# Patient Record
Sex: FEMALE | Race: WHITE | NOT HISPANIC OR LATINO | Employment: PART TIME | URBAN - METROPOLITAN AREA
[De-identification: names, ages, dates, MRNs, and addresses within clinical notes are randomized per-mention and may not be internally consistent; named-entity substitution may affect disease eponyms.]

---

## 2018-03-01 ENCOUNTER — THERAPY VISIT (OUTPATIENT)
Dept: PHYSICAL THERAPY | Facility: CLINIC | Age: 43
End: 2018-03-01
Payer: COMMERCIAL

## 2018-03-01 DIAGNOSIS — M54.12 CERVICAL RADICULOPATHY: Primary | ICD-10-CM

## 2018-03-01 PROCEDURE — 97110 THERAPEUTIC EXERCISES: CPT | Mod: GP | Performed by: PHYSICAL THERAPIST

## 2018-03-01 PROCEDURE — 97161 PT EVAL LOW COMPLEX 20 MIN: CPT | Mod: GP | Performed by: PHYSICAL THERAPIST

## 2018-03-01 NOTE — PROGRESS NOTES
[facility] Initial Evaluation -- Cervical    Evaluation Date: March 1, 2018  Emma Bills is a 42 year old female with a neck condition.   Referral: Dr. 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  Functional disability score (NDI):  36%  VAS score (0-10): 4/10; at worst 9-10/10.    Patient goals/expectations:  Alleviate neck and arm pain so that patient can play with her children, look over her left shoulder to drive with ease, lift to be able to shop, increase activity, and work without neck and L UE pain.  Sleep through the night    HISTORY:    Present symptoms:  Neck pain with sharp pain to elbow and symptoms down the L UE into the middle and ring finger.    Pain quality (sharp/shooting/stabbing/aching/burning/cramping): ache, sharp, shooting, tingling  .  Paresthesia (yes/no):  yes    Present since (onset date): beginning of February.     Symptoms (improving/unchanging/worsening):  Improving with Prednisone..    Symptoms commenced as a result of: ?slept wrong (woke with the pain)   Condition occurred in the following environment:  home    Symptoms at onset (neck/arm/forearm/headache): shoulder and L UE  Constant symptoms (neck/arm/forearm/headache): neck, L shoulder down L UE  Intermittent symptoms (neck/arm/forearm/headache): none    Symptoms are made worse with the following: Always Bending, always Turning, Always Rising, time of day - Always PM and Sometimes on the move  Symptoms are made better with the following: Always Lying and Always When still, heat and medication    Disturbed sleep (yes/no): yes - wakes 1 time and then not able to get back o sleep  Number of pillows: 1 (medium)   Sleeping postures (prone/sup/side R/L): L side is preferred side but now sleeping on her back due to the pain    Previous episodes (0/1-5/6-10/11+): 5 Year of first episode:      Previous history: started with L UE symptoms  Previous treatments: PT in past was helpful     Specific Questions: (as reported by the patient)  Dizziness/Tinnitus/Nausea/Swallowing (pos/neg): negative   Gait/Upper Limbs (normal/abnormal): normal  Medications (nil/NSAIDS/anlag/steroids/anticoag/other):  Steroids  Medical allergies:  Compazine, Droperidol  General health (excellent/good/fair/poor):  excellent  Pertinent medical history:  None  Imaging (None/Xray/MRI/Other):  MRI last episode had cervical MRI  Recent or major surgery (yes/no): no  Night pain (yes/no): yes  Accidents (yes/no): no  Unexplained weight loss (yes/no): no  Barriers at home: no  Other red flags: no    EXAMINATION    Posture:   Sitting (good/fair/poor): good/fair  Standing (good/fair/poor): good     Protruded head (yes/no): no    Wry Neck (right/left/nil):  no  Relevant (yes/no):  no     Correction of posture(better/worse/no effect): better  Other observations:  Decreased cervical lordosis.  Turns body to look to the L    Neurological:    Motor Deficit:  5-/5 Triceps, weakness of intrinsics and thumb ext on L 3/5    Reflexes:  Symmetrical and brisk  Sensory Deficit:  Decreased sensation C7-T1      Dural signs:  Negative Ulnar    Movement Loss:   Erick Mod Min Nil Pain   Protrusion   x  Inc neck pain   Flexion     45 deg with neck   Retraction x    Neck pain    Extension     24 deg with inc neck pain    Lateral flexion R     40 deg with neck pain   Lateral flexion L     45 deg with inc neck pain   Rotation R     75 deg with inc neck pain   Rotation L     75 deg with inc neck pain     Test Movements:   During: produces, abolishes, increases, decreases, no effect, centralizing, peripheralizing  After: better, worse, no better, no worse, no effect, centralized, peripheralized    Pretest symptoms sittin/10 neck pain with symptoms down L UE to just past elbow    Symptoms During Symptoms After ROM increased ROM decreased No Effect   PRO           Rep PRO          RET Increases    No Worse         Rep RET Increases    Worse      x   RET EXT          Rep RET EXT          Pretest symptoms lyin/10 neck and down L UE with dec strength L thumb ext, intrinsics   Symptoms During Symptoms After ROM increased ROM decreased No Effect   RET Increases      No Worse         Rep RET Decreases  Increased ROM    Better  centralizing    x     RET EXT          Rep RET EXT          If required, pretest symptoms sittin/10 L neck and sx down L UE to just past elbow, dec strength thumb ext and intrinsics L hand   Symptoms During Symptoms After ROM increased ROM decreased No Effect   LF-R          Rep LF-R          LF-L Increases    No Worse         Rep LF-L Increases    Better    X  Initially had improved strength but 2nd set dec strength     ROT-R          Rep ROT-R          ROT-L          Rep ROT-L          FLEX          Rep FLEX              Static Tests:   Protrusion:  NT  Flexion:  NT  Retraction:  NT  Extension (sitting/prone/supine):  NT    Other Tests: none    Provisional Classification:  Derangement - Asymmetrical, unilateral, symptoms below elbow    Principle of Management:  Education:  Had patient sit with lumbar roll.  Continue to use roll and good posture at home.  Reviewed centralization/peripheralization.  Discussed assessing neck pain, ROM and thumb strength as baselines; Make sure there is not a worsening of L UE symptoms with exercises or activity at home.  Discussed trying to do exercises lying down as that is where she had her best response.  Equipment provided:  None (has a lumbar roll)  Mechanical therapy (Y/N):  yes   Extension principle:  Repeated supine neck retraction - start with head supported but if not making progress then work to head off edge of bed at home.     Lateral principle:  no  Flexion principle:  no     Other:  no    ASSESSMENT/PLAN:    Patient is a 42 year old female with cervical complaints.    Patient has the following  significant findings with corresponding treatment plan.                Diagnosis 1:  Cervical radiculopathy    Pain -  manual therapy, self management, education, directional preference exercise, home program and modalities as needed  Decreased ROM/flexibility - manual therapy, therapeutic exercise and home program  Decreased joint mobility - manual therapy, therapeutic exercise and home program  Decreased strength - therapeutic exercise, therapeutic activities and home program  Decreased function - therapeutic activities and home program  Impaired posture - neuro re-education, therapeutic activities and home program    Therapy Evaluation Codes:   1) History comprised of:   Personal factors that impact the plan of care:      None.    Comorbidity factors that impact the plan of care are:      None.     Medications impacting care: None.  2) Examination of Body Systems comprised of:   Body structures and functions that impact the plan of care:      Cervical spine.   Activity limitations that impact the plan of care are:      Bathing, Bending, Driving, Dressing, Lifting, Running, Sports and reaching/carrying with the L UE.  3) Clinical presentation characteristics are:   Stable/Uncomplicated.  4) Decision-Making    Moderate complexity using standardized patient assessment instrument and/or measureable assessment of functional outcome.  Cumulative Therapy Evaluation is: Low complexity.    Previous and current functional limitations:  (See Goal Flow Sheet for this information)    Short term and Long term goals: (See Goal Flow Sheet for this information)     Communication ability:  Patient appears to be able to clearly communicate and understand verbal and written communication and follow directions correctly.  Treatment Explanation - The following has been discussed with the patient:   RX ordered/plan of care  Anticipated outcomes  Possible risks and side effects  This patient would benefit from PT intervention to resume  normal activities.   Rehab potential is good.    Frequency:  1-2 X week, once daily  Duration:  for 2 weeks tapering to 1 X a week over 4 weeks  Discharge Plan:  Achieve all LTG.  Independent in home treatment program.  Reach maximal therapeutic benefit.    Please refer to the daily flowsheet for treatment today, total treatment time and time spent performing 1:1 timed codes.

## 2018-03-01 NOTE — LETTER
[facility] PHYSICAL THERAPY  [address]  [facility] [address]  [phone]    2018    Re: Emma Bills   :   1975  MRN:  [id number]   REFERRING PHYSICIAN:   Jesusita Batista    [facility] PHYSICAL THERAPY  Date of Initial Evaluation: 3/1/18  Visits:  Rxs Used: 1  Reason for Referral:  Cervical radiculopathy    EVALUATION SUMMARY    [facility] Initial Evaluation -- Cervical  Evaluation Date: 2018  Emma Bills is a 42 year old female with a neck condition.   Referral: Dr. 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  Functional disability score (NDI):  36%  VAS score (0-10): 4/10; at worst 9-10/10.    Patient goals/expectations:  Alleviate neck and arm pain so that patient can play with her children, look over her left shoulder to drive with ease, lift to be able to shop, increase activity, and work without neck and L UE pain.  Sleep through the night    HISTORY:  Present symptoms:  Neck pain with sharp pain to elbow and symptoms down the L UE into the middle and ring finger.    Pain quality (sharp/shooting/stabbing/aching/burning/cramping): ache, sharp, shooting, tingling  .  Paresthesia (yes/no):  yes    Present since (onset date): beginning of February.     Symptoms (improving/unchanging/worsening):  Improving with Prednisone..    Symptoms commenced as a result of: ?slept wrong (woke with the pain)   Condition occurred in the following environment:  home    Symptoms at onset (neck/arm/forearm/headache): shoulder and L UE  Constant symptoms (neck/arm/forearm/headache): neck, L shoulder down L UE  Intermittent symptoms (neck/arm/forearm/headache): none    Symptoms are made worse with the following: Always Bending, always Turning, Always Rising, time of day - Always PM and Sometimes on the  move  Symptoms are made better with the following: Always Lying and Always When still, heat and medication    Disturbed sleep (yes/no): yes - wakes 1 time and then not able to get back o sleep Number of pillows: 1 (medium)   Sleeping postures (prone/sup/side R/L): L side is preferred side but now sleeping on her back due to the pain    Previous episodes (0/1-5/6-10/11+): 5 Year of first episode: 2015    Previous history: started with L UE symptoms  Previous treatments: PT in past was helpful     Specific Questions: (as reported by the patient)  Dizziness/Tinnitus/Nausea/Swallowing (pos/neg): negative   Gait/Upper Limbs (normal/abnormal): normal  Medications (nil/NSAIDS/anlag/steroids/anticoag/other):  Steroids  Medical allergies:  Compazine, Droperidol  General health (excellent/good/fair/poor):  excellent  Pertinent medical history:  None  Imaging (None/Xray/MRI/Other):  MRI last episode had cervical MRI  Recent or major surgery (yes/no): no  Night pain (yes/no): yes  Accidents (yes/no): no  Unexplained weight loss (yes/no): no  Barriers at home: no  Other red flags: no    EXAMINATION    Posture:   Sitting (good/fair/poor): good/fair  Standing (good/fair/poor): good     Protruded head (yes/no): no    Wry Neck (right/left/nil):  no  Relevant (yes/no):  no     Correction of posture(better/worse/no effect): better  Other observations:  Decreased cervical lordosis.  Turns body to look to the L    Neurological:  Motor Deficit:  5-/5 Triceps, weakness of intrinsics and thumb ext on L 3/5    Reflexes:  Symmetrical and brisk  Sensory Deficit:  Decreased sensation C7-T1      Dural signs:  Negative Ulnar    Movement Loss:   Erick Mod Min Nil Pain   Protrusion   x  Inc neck pain   Flexion     45 deg with neck   Retraction x    Neck pain    Extension     24 deg with inc neck pain    Lateral flexion R     40 deg with neck pain   Lateral flexion L     45 deg with inc neck pain   Rotation R     75 deg with inc neck pain   Rotation  L     75 deg with inc neck pain     Test Movements:   During: produces, abolishes, increases, decreases, no effect, centralizing, peripheralizing  After: better, worse, no better, no worse, no effect, centralized, peripheralized    Pretest symptoms sittin/10 neck pain with symptoms down L UE to just past elbow    Symptoms During Symptoms After ROM increased ROM decreased No Effect   PRO          Rep PRO          RET Increases    No Worse         Rep RET Increases    Worse      x   RET EXT          Rep RET EXT          Pretest symptoms lyin/10 neck and down L UE with dec strength L thumb ext, intrinsics   Symptoms During Symptoms After ROM increased ROM decreased No Effect   RET Increases      No Worse         Rep RET Decreases  Increased ROM    Better  centralizing    x     RET EXT          Rep RET EXT          If required, pretest symptoms sittin/10 L neck and sx down L UE to just past elbow, dec strength thumb ext and intrinsics L hand   Symptoms During Symptoms After ROM increased ROM decreased No Effect   LF-R          Rep LF-R          LF-L Increases    No Worse         Rep LF-L Increases    Better    X  Initially had improved strength but 2nd set dec strength     ROT-R          Rep ROT-R          ROT-L          Rep ROT-L          FLEX          Rep FLEX          Static Tests:   Protrusion:  NT  Flexion:  NT  Retraction:  NT  Extension (sitting/prone/supine):  NT    Other Tests: none    Provisional Classification:  Derangement - Asymmetrical, unilateral, symptoms below elbow    Principle of Management:  Education:  Had patient sit with lumbar roll.  Continue to use roll and good posture at home.  Reviewed centralization/peripheralization.  Discussed assessing neck pain, ROM and thumb strength as baselines; Make sure there is not a worsening of L UE symptoms with exercises or activity at home.  Discussed trying to do exercises lying down as that is where she had her best response.  Equipment  provided:  None (has a lumbar roll)  Mechanical therapy (Y/N):  yes   Extension principle:  Repeated supine neck retraction - start with head supported but if not making progress then work to head off edge of bed at home.     Lateral principle:  no  Flexion principle:  no     Other:  no    ASSESSMENT/PLAN:  Patient is a 42 year old female with cervical complaints.    Patient has the following significant findings with corresponding treatment plan.                  Diagnosis 1:  Cervical radiculopathy    Pain -  manual therapy, self management, education, directional preference exercise, home program and modalities as needed  Decreased ROM/flexibility - manual therapy, therapeutic exercise and home program  Decreased joint mobility - manual therapy, therapeutic exercise and home program  Decreased strength - therapeutic exercise, therapeutic activities and home program  Decreased function - therapeutic activities and home program  Impaired posture - neuro re-education, therapeutic activities and home program    Therapy Evaluation Codes:   1) History comprised of:   Personal factors that impact the plan of care:      None.    Comorbidity factors that impact the plan of care are:      None.     Medications impacting care: None.  2) Examination of Body Systems comprised of:   Body structures and functions that impact the plan of care:      Cervical spine.   Activity limitations that impact the plan of care are:      Bathing, Bending, Driving, Dressing, Lifting, Running, Sports and reaching/carrying with the L UE.  3) Clinical presentation characteristics are:   Stable/Uncomplicated.  4) Decision-Making    Moderate complexity using standardized patient assessment instrument and/or measureable assessment of functional outcome.  Cumulative Therapy Evaluation is: Low complexity.    Previous and current functional limitations:  (See Goal Flow Sheet for this information)    Short term and Long term goals: (See Goal Flow Sheet  for this information)     Communication ability:  Patient appears to be able to clearly communicate and understand verbal and written communication and follow directions correctly.    Treatment Explanation - The following has been discussed with the patient:   RX ordered/plan of care  Anticipated outcomes  Possible risks and side effects    This patient would benefit from PT intervention to resume normal activities.   Rehab potential is good.  Frequency:  1-2 X week, once daily  Duration:  for 2 weeks tapering to 1 X a week over 4 weeks  Discharge Plan:  Achieve all LTG.  Independent in home treatment program.  Reach maximal therapeutic benefit.    Thank you for your referral.    INQUIRIES  Therapist: Pepper Urias, PT, Dip. MDT, FAAOMPT   [facility] PHYSICAL THERAPY  [address]  [facility] [address]  Phone: [phone]  Fax: [phone]

## 2018-03-08 ENCOUNTER — THERAPY VISIT (OUTPATIENT)
Dept: PHYSICAL THERAPY | Facility: CLINIC | Age: 43
End: 2018-03-08
Payer: COMMERCIAL

## 2018-03-08 DIAGNOSIS — M54.12 CERVICAL RADICULOPATHY: ICD-10-CM

## 2018-03-08 PROCEDURE — 97140 MANUAL THERAPY 1/> REGIONS: CPT | Mod: GP | Performed by: PHYSICAL THERAPIST

## 2018-03-09 ENCOUNTER — THERAPY VISIT (OUTPATIENT)
Dept: PHYSICAL THERAPY | Facility: CLINIC | Age: 43
End: 2018-03-09
Payer: COMMERCIAL

## 2018-03-09 DIAGNOSIS — M54.12 CERVICAL RADICULOPATHY: ICD-10-CM

## 2018-03-09 PROCEDURE — 97140 MANUAL THERAPY 1/> REGIONS: CPT | Mod: GP | Performed by: PHYSICAL THERAPIST

## 2018-03-13 ENCOUNTER — THERAPY VISIT (OUTPATIENT)
Dept: PHYSICAL THERAPY | Facility: CLINIC | Age: 43
End: 2018-03-13
Payer: COMMERCIAL

## 2018-03-13 DIAGNOSIS — M54.12 CERVICAL RADICULOPATHY: ICD-10-CM

## 2018-03-13 PROCEDURE — 97140 MANUAL THERAPY 1/> REGIONS: CPT | Mod: GP | Performed by: PHYSICAL THERAPIST

## 2018-03-15 ENCOUNTER — THERAPY VISIT (OUTPATIENT)
Dept: PHYSICAL THERAPY | Facility: CLINIC | Age: 43
End: 2018-03-15
Payer: COMMERCIAL

## 2018-03-15 DIAGNOSIS — M54.12 CERVICAL RADICULOPATHY: ICD-10-CM

## 2018-03-15 PROCEDURE — 97140 MANUAL THERAPY 1/> REGIONS: CPT | Mod: GP | Performed by: PHYSICAL THERAPIST

## 2018-03-22 ENCOUNTER — THERAPY VISIT (OUTPATIENT)
Dept: PHYSICAL THERAPY | Facility: CLINIC | Age: 43
End: 2018-03-22
Payer: COMMERCIAL

## 2018-03-22 DIAGNOSIS — M54.12 CERVICAL RADICULOPATHY: ICD-10-CM

## 2018-03-22 PROCEDURE — 97110 THERAPEUTIC EXERCISES: CPT | Mod: GP | Performed by: PHYSICAL THERAPIST

## 2018-03-22 PROCEDURE — 97140 MANUAL THERAPY 1/> REGIONS: CPT | Mod: GP | Performed by: PHYSICAL THERAPIST

## 2018-04-03 ENCOUNTER — THERAPY VISIT (OUTPATIENT)
Dept: PHYSICAL THERAPY | Facility: CLINIC | Age: 43
End: 2018-04-03
Payer: COMMERCIAL

## 2018-04-03 DIAGNOSIS — M54.12 CERVICAL RADICULOPATHY: ICD-10-CM

## 2018-04-03 PROCEDURE — 97110 THERAPEUTIC EXERCISES: CPT | Mod: GP | Performed by: PHYSICAL THERAPIST

## 2018-04-03 PROCEDURE — 97140 MANUAL THERAPY 1/> REGIONS: CPT | Mod: GP | Performed by: PHYSICAL THERAPIST

## 2018-04-09 ENCOUNTER — THERAPY VISIT (OUTPATIENT)
Dept: PHYSICAL THERAPY | Facility: CLINIC | Age: 43
End: 2018-04-09
Payer: COMMERCIAL

## 2018-04-09 DIAGNOSIS — M54.12 CERVICAL RADICULOPATHY: ICD-10-CM

## 2018-04-09 PROCEDURE — 97140 MANUAL THERAPY 1/> REGIONS: CPT | Mod: GP | Performed by: PHYSICAL THERAPIST

## 2018-04-09 PROCEDURE — 97110 THERAPEUTIC EXERCISES: CPT | Mod: GP | Performed by: PHYSICAL THERAPIST

## 2018-04-24 ENCOUNTER — THERAPY VISIT (OUTPATIENT)
Dept: PHYSICAL THERAPY | Facility: CLINIC | Age: 43
End: 2018-04-24
Payer: COMMERCIAL

## 2018-04-24 DIAGNOSIS — M54.12 CERVICAL RADICULOPATHY: ICD-10-CM

## 2018-04-24 PROCEDURE — 97530 THERAPEUTIC ACTIVITIES: CPT | Mod: GP | Performed by: PHYSICAL THERAPIST

## 2018-04-24 PROCEDURE — 97110 THERAPEUTIC EXERCISES: CPT | Mod: GP | Performed by: PHYSICAL THERAPIST

## 2018-04-24 NOTE — PROGRESS NOTES
"Subjective:  HPI                    Objective:  System    Physical Exam    General     ROS    Assessment/Plan:    PROGRESS  REPORT    Progress reporting period is from 3/1/2018 to 4/24/2018.       SUBJECTIVE  Patient relates that she feels she is \"stuck\". She has had no change since last treatment, however, now is feeling more symptoms in the neck and along the spine. Arm pain is constantly there other than when laying down on back/sleeping. She feels her ROM and strength are better, but her symptoms are not. 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  Not at work now..Continues to do isometrics and SB range of motion at home. Now tolerant of doing her exercises sitting vs laying down. Not taking prednisone or any other anti inflammatory at this time     Current Pain level: 3/10 (between shoulder blades; 1/10 down arm middle finger).     Initial Pain level:  (9-10/10).   Changes in function:  Yes (See Goal flowsheet attached for changes in current functional level)  Adverse reaction to treatment or activity: None    OBJECTIVE  Changes noted in objective findings:  Yes, improved strength.  ROM tends to fluctuate,   Objective:   MMT: C6 -5/5;  5/5 thumb extension (was weak with thumb ext 3/5, at evaluation)  CROM L rot 58 deg, R rot 68, ext 14 limited by pain, R SB 32, L SB 35 stiffness and pain B  ROM improves with repeated SB to the L (ext 39 deg with B rotation 75 degrees B).  Cues needed for proper posture.       ASSESSMENT/PLAN  Updated problem list and treatment plan: Diagnosis 1:  Cervical radiculopathy    Pain -  manual therapy, self management, education, directional preference exercise and home program  Decreased ROM/flexibility - manual therapy, therapeutic exercise and home program  Decreased joint mobility - manual therapy, therapeutic exercise and home program  Decreased strength - therapeutic exercise, therapeutic activities and home program  Impaired muscle " performance - neuro re-education and home program  Decreased function - therapeutic activities and home program  Impaired posture - neuro re-education, therapeutic activities and home program  STG/LTGs have been met or progress has been made towards goals:  Yes (See Goal flow sheet completed today.)  Assessment of Progress: The patient's condition is improving.  The patient's condition has potential to improve.  Patient is meeting short term goals and is progressing towards long term goals.  Self Management Plans:  Patient has been instructed in a home treatment program.  Patient  has been instructed in self management of symptoms.  I have re-evaluated this patient and find that the nature, scope, duration and intensity of the therapy is appropriate for the medical condition of the patient.  Emma continues to require the following intervention to meet STG and LTG's:  PT    Recommendations:  This patient would benefit from continued therapy.     Frequency:  1 X week, once daily  Duration:  for 8 weeks        Please refer to the daily flowsheet for treatment today, total treatment time and time spent performing 1:1 timed codes.

## 2018-04-24 NOTE — LETTER
"[facility] PHYSICAL THERAPY  [address]  [facility] [address]  [phone]    2018    Re: Emma Bills   :   1975  MRN:  [id number]   REFERRING PHYSICIAN:   Jesusita Batista    [facility] PHYSICAL THERAPY  Date of Initial Evaluation:  3/8/18  Visits:  Rxs Used: 9  Reason for Referral:  Cervical radiculopathy    PROGRESS  REPORT  Progress reporting period is from 3/1/2018 to 2018.       SUBJECTIVE  Patient relates that she feels she is \"stuck\". She has had no change since last treatment, however, now is feeling more symptoms in the neck and along the spine. Arm pain is constantly there other than when laying down on back/sleeping. She feels her ROM and strength are better, but her symptoms are not. 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  Not at work now..Continues to do isometrics and SB range of motion at home. Now tolerant of doing her exercises sitting vs laying down. Not taking prednisone or any other anti inflammatory at this time.       Current Pain level: 3/10 (between shoulder blades; 1/10 down arm middle finger).     Initial Pain level:  (9-10/10).   Changes in function:  Yes (See Goal flowsheet attached for changes in current functional level)  Adverse reaction to treatment or activity: None    OBJECTIVE  Changes noted in objective findings:  Yes, improved strength.  ROM tends to fluctuate,     Objective:   MMT: C6 -5/5;  5/5 thumb extension (was weak with thumb ext 3/5, at evaluation)  CROM L rot 58 deg, R rot 68, ext 14 limited by pain, R SB 32, L SB 35 stiffness and pain B    ROM improves with repeated SB to the L (ext 39 deg with B rotation 75 degrees B).  Cues needed for proper posture.       ASSESSMENT/PLAN  Updated problem list and treatment plan:     Diagnosis 1:  Cervical radiculopathy    Pain -  manual therapy, self management, education, directional preference exercise and home " program  Decreased ROM/flexibility - manual therapy, therapeutic exercise and home program  Decreased joint mobility - manual therapy, therapeutic exercise and home program  Decreased strength - therapeutic exercise, therapeutic activities and home program  Impaired muscle performance - neuro re-education and home program  Decreased function - therapeutic activities and home program  Impaired posture - neuro re-education, therapeutic activities and home program    STG/LTGs have been met or progress has been made towards goals:  Yes (See Goal flow sheet completed today.)    Assessment of Progress: The patient's condition is improving.  The patient's condition has potential to improve.    Patient is meeting short term goals and is progressing towards long term goals.  Self Management Plans:  Patient has been instructed in a home treatment program.  Patient  has been instructed in self management of symptoms.    I have re-evaluated this patient and find that the nature, scope, duration and intensity of the therapy is appropriate for the medical condition of the patient.    Emma continues to require the following intervention to meet STG and LTG's:  PT    Recommendations:  This patient would benefit from continued therapy.     Frequency:  1 X week, once daily  Duration:  for 8 weeks    Thank you for your referral.    INQUIRIES  Therapist: Pepper Urias, PT, Dip. MDT, FAAOMPT  [facility] PHYSICAL THERAPY  [address]  [facility] [address]  Phone: [phone]  Fax: [phone]

## 2018-05-02 ENCOUNTER — THERAPY VISIT (OUTPATIENT)
Dept: PHYSICAL THERAPY | Facility: CLINIC | Age: 43
End: 2018-05-02
Payer: COMMERCIAL

## 2018-05-02 DIAGNOSIS — M54.12 CERVICAL RADICULOPATHY: ICD-10-CM

## 2018-05-02 PROCEDURE — 97110 THERAPEUTIC EXERCISES: CPT | Mod: GP | Performed by: PHYSICAL THERAPIST

## 2018-05-09 ENCOUNTER — THERAPY VISIT (OUTPATIENT)
Dept: PHYSICAL THERAPY | Facility: CLINIC | Age: 43
End: 2018-05-09
Payer: COMMERCIAL

## 2018-05-09 DIAGNOSIS — M54.12 CERVICAL RADICULOPATHY: ICD-10-CM

## 2018-05-09 PROCEDURE — 97110 THERAPEUTIC EXERCISES: CPT | Mod: GP | Performed by: PHYSICAL THERAPIST

## 2018-05-09 NOTE — LETTER
[facility] PHYSICAL THERAPY  [address]  [facility] [address]  [phone]    May 9, 2018    Re: Emma Bills   :   1975  MRN:  [id number]   REFERRING PHYSICIAN:   Jesusita Batista    [facility] PHYSICAL THERAPY  Date of Initial Evaluation: 3/1/18  Visits:  Rxs Used: 11  Reason for Referral:  Cervical radiculopathy    PROGRESS  REPORT  Progress reporting period is from 2018 to .       SUBJECTIVE  Patient relates that she is continuing to experience constant pain of the lower neck, between her shoulder blades and down the L UE into her knuckles.  She has had much less numbness or tingling of the L UE.  She will feel numbness of the L UE for about a minute after her neck exercises (neck extension).  After the numbness resolves, her pain for the L UE may be a little more.  Does feel that over the past week she has had less sensitivity for the L UE.  She has less pain when putting lotion on the L UE, rings on the L hand are not bothering like they did before but cold water on the L hand is still very painful.  Patient is able to be more active during the day, but by about 8 pm she is done for the day with increased pain and just needs to lay down.      Patient has not been working due to low census and due to her symptoms.  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  She is fearful to have surgery, but feels that she can not live the way that she is feeling.  Fearful that she will have another flare up if this episode finally clears.  This episode was much more painful and is lasting significantly longer than the first episode of neck and L UE symptoms that she had a couple of years ago.  Really uncertain which route to take - surgery or no surgery.         Current Pain level: 3/10 (at worst 5-6/10 when gets the random shooting pain down L UE).      Previous pain level was  3/10   Initial Pain level: 9-10/10.   Changes in function:  Yes (See Goal flowsheet attached for changes in current functional level)  Adverse reaction to treatment or activity: None    OBJECTIVE  Changes noted in objective findings:  Yes, improved neck ROM, increased strength, improving posture and function.    Objective:   Patient tends to sit with slight flexion of the neck with a slight bend to the L when symptoms are more intense; able to self correct when symptoms are not as bad.    MMT for L UE Thumb ext 5-/5, intrinsics 5-/5, pain with resisted elbow extension - not sure if gives due to pain or weakness or both.    CROM Rot R 73 with symptoms down the L UE, Rot L 65 with symptoms R neck, ext 45.      ROM will improve with repeated neck retraction without worsening of arm symptoms.   Normalized sensation to light touch today     ASSESSMENT/PLAN  Updated problem list and treatment plan:     Diagnosis 1:  Cervical radiculopathy    Pain -  manual therapy, self management, education, directional preference exercise and home program  Decreased ROM/flexibility - manual therapy, therapeutic exercise and home program  Decreased joint mobility - manual therapy, therapeutic exercise and home program  Decreased strength - therapeutic exercise, therapeutic activities and home program  Decreased function - therapeutic activities and home program  Impaired posture - neuro re-education, therapeutic activities and home program    STG/LTGs have been met or progress has been made towards goals:  Yes (See Goal flow sheet completed today - ROM is improving for extension.) and progress has been very slow.  Increasing activity at home..      Assessment of Progress: The patient's condition is improving.    The patient's condition has potential to improve.  Patient is meeting short term goals and is progressing towards long term goals.  Slow progress    Self Management Plans:  Patient has been instructed  in a home treatment program.  Patient  has been instructed in self management of symptoms.    I have re-evaluated this patient and find that the nature, scope, duration and intensity of the therapy is appropriate for the medical condition of the patient.    Emma continues to require the following intervention to meet STG and LTG's:  PT    Recommendations:  This patient would benefit from continued therapy.     Frequency:  1 X week, once daily  Duration:  for 6 weeks  Continue as indicated.  Did regress forces with neck exercises to avoid producing numbness of the L UE.       Thank you for your referral.    INQUIRIES  Therapist: Pepper Urias, PT, Dip. MDT, FAAOMPT   [facility] PHYSICAL THERAPY  [address]  [facility] [address]  Phone: [phone] Fax: [phone]

## 2018-05-09 NOTE — PROGRESS NOTES
Subjective:  HPI                    Objective:  System    Physical Exam    General     ROS    Assessment/Plan:    PROGRESS  REPORT    Progress reporting period is from 4/24/2018 to 5/9/218.       SUBJECTIVE  Patient relates that she is continuing to experience constant pain of the lower neck, between her shoulder blades and down the L UE into her knuckles.  She has had much less numbness or tingling of the L UE.  She will feel numbness of the L UE for about a minute after her neck exercises (neck extension).  After the numbness resolves, her pain for the L UE may be a little more.  Does feel that over the past week she has had less sensitivity for the L UE.  She has less pain when putting lotion on the L UE, rings on the L hand are not bothering like they did before but cold water on the L hand is still very painful.  Patient is able to be more active during the day, but by about 8 pm she is done for the day with increased pain and just needs to lay down.  Patient has not been working due to low census and due to her symptoms.  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  She is fearful to have surgery, but feels that she can not live the way that she is feeling.  Fearful that she will have another flare up if this episode finally clears.  This episode was much more painful and is lasting significantly longer than the first episode of neck and L UE symptoms that she had a couple of years ago.  Really uncertain which route to take - surgery or no surgery.       Current Pain level: 3/10 (at worst 5-6/10 when gets the random shooting pain down L UE).     Previous pain level was  3/10   Initial Pain level: 9-10/10.   Changes in function:  Yes (See Goal flowsheet attached for changes in current functional level)  Adverse reaction to treatment or activity: None    OBJECTIVE  Changes noted in objective findings:  Yes,  improved neck ROM, increased strength, improving posture and function.  Objective:   Patient tends to sit with slight flexion of the neck with a slight bend to the L when symptoms are more intense; able to self correct when symptoms are not as bad.    MMT for L UE Thumb ext 5-/5, intrinsics 5-/5, pain with resisted elbow extension - not sure if gives due to pain or weakness or both..    CROM Rot R 73 with symptoms down the L UE, Rot L 65 with symptoms R neck, ext 45.    ROM will improve with repeated neck retraction without worsening of arm symptoms.   Normalized sensation to light touch today     ASSESSMENT/PLAN  Updated problem list and treatment plan: Diagnosis 1:  Cervical radiculopathy    Pain -  manual therapy, self management, education, directional preference exercise and home program  Decreased ROM/flexibility - manual therapy, therapeutic exercise and home program  Decreased joint mobility - manual therapy, therapeutic exercise and home program  Decreased strength - therapeutic exercise, therapeutic activities and home program  Decreased function - therapeutic activities and home program  Impaired posture - neuro re-education, therapeutic activities and home program  STG/LTGs have been met or progress has been made towards goals:  Yes (See Goal flow sheet completed today - ROM is improving for extension.) and progress has been very slow.  Increasing activity at home..    Assessment of Progress: The patient's condition is improving.  The patient's condition has potential to improve.  Patient is meeting short term goals and is progressing towards long term goals.  Slow progress  Self Management Plans:  Patient has been instructed in a home treatment program.  Patient  has been instructed in self management of symptoms.  I have re-evaluated this patient and find that the nature, scope, duration and intensity of the therapy is appropriate for the medical condition of the patient.  Emma continues to require  the following intervention to meet STG and LTG's:  PT    Recommendations:  This patient would benefit from continued therapy.     Frequency:  1 X week, once daily  Duration:  for 6 weeks  Continue as indicated.  Did regress forces with neck exercises to avoid producing numbness of the L UE.        Please refer to the daily flowsheet for treatment today, total treatment time and time spent performing 1:1 timed codes.

## 2022-05-19 ENCOUNTER — TRANSFERRED RECORDS (OUTPATIENT)
Dept: HEALTH INFORMATION MANAGEMENT | Facility: CLINIC | Age: 47
End: 2022-05-19

## 2022-06-08 ENCOUNTER — TRANSFERRED RECORDS (OUTPATIENT)
Dept: HEALTH INFORMATION MANAGEMENT | Facility: CLINIC | Age: 47
End: 2022-06-08

## 2022-11-10 ENCOUNTER — TRANSFERRED RECORDS (OUTPATIENT)
Dept: HEALTH INFORMATION MANAGEMENT | Facility: CLINIC | Age: 47
End: 2022-11-10

## 2023-01-06 ENCOUNTER — TRANSFERRED RECORDS (OUTPATIENT)
Dept: HEALTH INFORMATION MANAGEMENT | Facility: CLINIC | Age: 48
End: 2023-01-06

## 2023-01-18 ENCOUNTER — TRANSFERRED RECORDS (OUTPATIENT)
Dept: HEALTH INFORMATION MANAGEMENT | Facility: CLINIC | Age: 48
End: 2023-01-18

## 2023-01-30 ENCOUNTER — TRANSFERRED RECORDS (OUTPATIENT)
Dept: HEALTH INFORMATION MANAGEMENT | Facility: CLINIC | Age: 48
End: 2023-01-30

## 2023-04-20 ENCOUNTER — TRANSFERRED RECORDS (OUTPATIENT)
Dept: HEALTH INFORMATION MANAGEMENT | Facility: CLINIC | Age: 48
End: 2023-04-20

## 2023-05-10 ENCOUNTER — TRANSFERRED RECORDS (OUTPATIENT)
Dept: HEALTH INFORMATION MANAGEMENT | Facility: CLINIC | Age: 48
End: 2023-05-10
Payer: COMMERCIAL

## 2023-05-11 ENCOUNTER — MEDICAL CORRESPONDENCE (OUTPATIENT)
Dept: HEALTH INFORMATION MANAGEMENT | Facility: CLINIC | Age: 48
End: 2023-05-11
Payer: COMMERCIAL

## 2023-05-16 ENCOUNTER — TRANSCRIBE ORDERS (OUTPATIENT)
Dept: OTHER | Age: 48
End: 2023-05-16

## 2023-05-16 DIAGNOSIS — H93.12 TINNITUS, LEFT: ICD-10-CM

## 2023-05-16 DIAGNOSIS — H83.8X3 SUPERIOR SEMICIRCULAR CANAL DEHISCENCE OF BOTH EARS: ICD-10-CM

## 2023-05-16 DIAGNOSIS — R42 DIZZINESS: Primary | ICD-10-CM

## 2023-05-16 DIAGNOSIS — H91.92 HIGH-FREQUENCY HEARING LOSS OF LEFT EAR: ICD-10-CM

## 2023-05-24 ENCOUNTER — TELEPHONE (OUTPATIENT)
Dept: OTOLARYNGOLOGY | Facility: CLINIC | Age: 48
End: 2023-05-24
Payer: COMMERCIAL

## 2023-05-24 NOTE — TELEPHONE ENCOUNTER
1. Have you noticed any changes in hearing? Sometimes: sensitive to loud things in left ear  2. Do you have ringing, buzzing, or other sounds in your ears or head, this is also referred to as Tinnitus? Yes  3. When and where was your last hearing test? [facility] [place] [facility] [place], [place]  4. Do you feel lightheaded or foggy? Sometimes, mores o just foggy brained when having a dizzy spell  5. Do you have a spinning sensation? Yes  6. Is there any specific position that can bring on dizziness? No, it just comes when not expecting it. But when bending down and coming up it does get worse.  7. Does looking up cause dizziness? No  8. Does getting in and our of bed cause dizziness? No  9. Does turning over in bed increase or cause dizziness? Sometimes: not unless having a really bad day  10. Does bending over cause dizziness? Yes  11. Is there anything that you can do to prevent the dizziness? no  12. Has the dizziness gotten better with time? No  13. Have you seen Physical Therapy for dizziness? (Please indicate clinic and as much of the location as possible): Yes, If yes, where? [facility]  if yes, who? Toyin Olea  14. Are you being referred to a specific physician? Yes: Dr. Alas  15. Have you been evaluated/treated for your dizziness at any other location?  (If yes,obtian as much clinic/provider/locaiton as possible) Yes. (If yes answer the following questions:)   Have you seen any ENT, Neurology, or other providers for these symptoms?             Yes, If yes, where? [facility] if yes, who? Dr. Kaitlin Garcia   Have you had any balance or Audiology testing? No Have you had an MRI or CT scan of your head or neck? Yes, If yes, where? [facility] if yes, who? Dr. Kaitlin Calles, [facility]   Would you like to receive your Release of Information by mail or e-mail?  mail

## 2023-05-31 NOTE — TELEPHONE ENCOUNTER
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, High-frequency hearing loss of left ear, Tinnitus, left referred by Dr Mik Kimbrough @ [facility]    RECORDS REQUESTED FROM:       Clinic name Comments Records Status Imaging Status   [facility] 4/23/23 note- Dr Mik Kimbrough  3/13/23 note- Dr Harris Carson    11/10/22 audiogram    *request for more recs - 5/30 - RECEIVED 6/2 - scan  11/10/22 audio note- Juan Padgett, [facility]  11/10/22 ent note- Omar Rizvi MD  6/30/22 ent note- Harris Carson MD  5/19/22 audio note- Yara Gold, MS  5/19/22 ent note- Harris Carson MD  5/19/22  audiogram Scanned in Psychiatric    [facility] MRN: [id number] 05/19/2022 MR Brain & IAC  06/08/22 CT Temporal Bones Scanned in Epic Pending req  req 7/10/23  - IN PACS   [facility] 1/30/23 telemedicine note - Dr Maty Lara    *additional recs needed request  * received additional recs - send to scan 6/6/23  12/10/22 OV note  1/6/23 MRA neck + MRA head  1/6/23 MR Cerivcal Scanned in Psychiatric Pacs   [facility] [place]  Phone [phone]  Fax [phone] 1/18/23 and 4/20/23 note - Toyin Olea Pending  RECEIVED send to scan 6/14       May 31, 2023 at 9:23 AM - LM x1 with CB number to discuss mailing BEAU to obtain PT recs @ [facility]. Request for more recs @ [facility]- Amy  June 2, 2023 at 10:59 AM - Received additional recs from [facility] and send to scanning -Amy  June 5, 2023 at 9:58 AM - Received signed BEAU and send to scanning. Request for records -Amy  June 7, 2023 at 4:20 PM - Additional [facility] recs received and send to scanning. Images resolved in pacs -Amy  June 14, 2023 at 10:31 AM - Re[facility] out to [facility] and spoke to Selma follow up on fax  request sent on 6/5. Selma said she have been off and just return but request is in her to do pile. Selma will work on this right away and fax over PT rec -Amy  June 14, 2023 at 1:25 PM - Received records from [facility] [place] and send to scanning. Forward to Audiology to review.-Amy  July 10, 2023 at 2:13 PM - request for images@ [facility] -Amy  July 31, 2023 at 6:46 AM - Images from [facility] resolved in pacs -Amy

## 2023-06-22 NOTE — TELEPHONE ENCOUNTER
Requesting orders for hearing test, VNG, VEMP and ECOG testing prior to patient's appointment with Dr. Alas on 10/6/2023.    Per record review: Patient is referred for bilateral SSCD as identified on CT imaging obtained 6/8/2022.  Patient has seen Dr. Kimbrough who did not recommend surgery, he suggested to log food intake and barometric changes.  Patient is on a low-salt diet. Patient reports a dizziness beginning November or December 2021.  Symptoms onset following COVID-19 infection.  Reports general dizziness and imbalance with occasional vertigo episodes.  No autophony.  Reports pressure, tinnitus and whooshing sensation in left ear for the past 6+ months.    CT performed 6/8/2022:  Conclusion:  1.  Bilateral superior semicircular canal dehiscence.  2.  Thinning and attenuation of the left tegmen tymani.    MRI performed 5/19/2022: Impression:  1.  Susceptibility artifact likely due to prior spinal surgery in the right anterior neck results in focal obscuration of the right common carotid artery.  2.  No evidence of significant carotid artery stenosis at the bifurcations, proximal internal carotid arteries or bilateral vertebral arteries.    [facility]/10/2022 showed normal hearing bilaterally.    Megan Hinson. CCC-A  Vestibular Audiologist   [place] #[id number]

## 2023-06-23 DIAGNOSIS — R42 DIZZINESS: Primary | ICD-10-CM

## 2023-07-10 ENCOUNTER — TELEPHONE (OUTPATIENT)
Dept: AUDIOLOGY | Facility: CLINIC | Age: 48
End: 2023-07-10
Payer: COMMERCIAL

## 2023-09-08 ENCOUNTER — TELEPHONE (OUTPATIENT)
Dept: AUDIOLOGY | Facility: CLINIC | Age: 48
End: 2023-09-08
Payer: COMMERCIAL

## 2023-09-08 NOTE — TELEPHONE ENCOUNTER
"\"I m calling from the Audiology and Balance Testing department at the . This is just a call to remind you of your upcoming Balance Testing appointment on [Date], and to see if you have any questions or concerns regarding the balance testing you'll be doing. You should have received an itinerary via mail or via [employer], if you are active, that goes over what to expect and explains the dos and don ts both 48 hours before, and the day of. There is a list of medications for you to review on the itinerary that we would like you to stop taking beforehand. If you didn t receive the itinerary or you still have questions, please give our clinic a call at [phone]. Otherwise, we will see you on [Date] starting at [Time].\"    Please send encounter if patient would like to reschedule.  "

## 2023-09-12 ENCOUNTER — OFFICE VISIT (OUTPATIENT)
Dept: AUDIOLOGY | Facility: CLINIC | Age: 48
End: 2023-09-12
Payer: COMMERCIAL

## 2023-09-12 ENCOUNTER — TRANSFERRED RECORDS (OUTPATIENT)
Dept: AUDIOLOGY | Facility: CLINIC | Age: 48
End: 2023-09-12

## 2023-09-12 DIAGNOSIS — R42 DIZZINESS AND GIDDINESS: Primary | ICD-10-CM

## 2023-09-12 DIAGNOSIS — R42 DIZZINESS: ICD-10-CM

## 2023-09-12 DIAGNOSIS — H93.12 TINNITUS OF LEFT EAR: Primary | ICD-10-CM

## 2023-09-12 PROCEDURE — 92545 OSCILLATING TRACKING TEST: CPT | Mod: 59 | Performed by: AUDIOLOGIST

## 2023-09-12 PROCEDURE — 92542 POSITIONAL NYSTAGMUS TEST: CPT | Mod: 59 | Performed by: AUDIOLOGIST

## 2023-09-12 PROCEDURE — 92550 TYMPANOMETRY & REFLEX THRESH: CPT | Performed by: AUDIOLOGIST

## 2023-09-12 PROCEDURE — 92519 VEMP TST I&R CERVICAL&OCULAR: CPT | Performed by: AUDIOLOGIST

## 2023-09-12 PROCEDURE — 92557 COMPREHENSIVE HEARING TEST: CPT | Performed by: AUDIOLOGIST

## 2023-09-12 PROCEDURE — 92541 SPONTANEOUS NYSTAGMUS TEST: CPT | Performed by: AUDIOLOGIST

## 2023-09-12 PROCEDURE — 92584 ELECTROCOCHLEOGRAPHY: CPT | Performed by: AUDIOLOGIST

## 2023-09-12 PROCEDURE — 92537 CALORIC VSTBLR TEST W/REC: CPT | Performed by: AUDIOLOGIST
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
## 2023-09-12 NOTE — PROGRESS NOTES
AUDIOLOGY REPORT     SUMMARY: Audiology visit completed. See audiogram for results.       RECOMMENDATIONS: Follow-up with ENT.     Sobia Houston CCC-A  Licensed Audiologist   [place] #[id number]
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
## 2023-10-06 ENCOUNTER — PRE VISIT (OUTPATIENT)
Dept: OTOLARYNGOLOGY | Facility: CLINIC | Age: 48
End: 2023-10-06

## 2023-10-06 ENCOUNTER — VIRTUAL VISIT (OUTPATIENT)
Dept: OTOLARYNGOLOGY | Facility: CLINIC | Age: 48
End: 2023-10-06
Attending: OTOLARYNGOLOGY
Payer: COMMERCIAL

## 2023-10-06 VITALS — BODY MASS INDEX: 22.22 KG/M2 | HEIGHT: 69 IN | WEIGHT: 150 LBS

## 2023-10-06 DIAGNOSIS — H93.12 TINNITUS, LEFT: ICD-10-CM

## 2023-10-06 DIAGNOSIS — H83.8X2 SUPERIOR SEMICIRCULAR CANAL DEHISCENCE OF LEFT EAR: ICD-10-CM

## 2023-10-06 DIAGNOSIS — R42 DIZZINESS: ICD-10-CM

## 2023-10-06 DIAGNOSIS — H91.92 HIGH-FREQUENCY HEARING LOSS OF LEFT EAR: ICD-10-CM

## 2023-10-06 DIAGNOSIS — G43.809 VESTIBULAR MIGRAINE: Primary | ICD-10-CM

## 2023-10-06 PROCEDURE — 99204 OFFICE O/P NEW MOD 45 MIN: CPT | Mod: VID | Performed by: OTOLARYNGOLOGY

## 2023-10-06 ASSESSMENT — PAIN SCALES - GENERAL: PAINLEVEL: NO PAIN (0)

## 2023-10-06 NOTE — PATIENT INSTRUCTIONS
You were seen in the ENT Clinic today by Dr. Alas. If you have any questions or concerns after your appointment, please contact us (see below)      2.   Check in with us in about 6 months if you decide you don't want the tube. If you would like the tube placed, let us know and we'll be happy to coordinate an appointment. Please contact the ENT clinic or one of direct numbers listed below.         How to Contact Us:  Send a [employer] message to your provider. Our team will respond to you via [employer]. Occasionally, we will need to call you to get further information.  For urgent matters (Monday-Friday), call the ENT Clinic: [phone] and speak with a call center team member - they will route your call appropriately.   If you'd like to speak directly with a nurse, please find our contact information below. We do our best to check voicemail frequently throughout the day, and will work to call you back within 1-2 days. For urgent matters, please use the general clinic phone numbers listed above.      Francisca WHITEHEAD RN  ENT RN Care Coordinator  Direct: [phone]    Rosa BRICENO LPN  Direct: [phone]
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
## 2023-10-10 ENCOUNTER — TELEPHONE (OUTPATIENT)
Dept: OTOLARYNGOLOGY | Facility: CLINIC | Age: 48
End: 2023-10-10
Payer: COMMERCIAL

## 2023-11-25 ENCOUNTER — HEALTH MAINTENANCE LETTER (OUTPATIENT)
Age: 48
End: 2023-11-25

## 2024-12-29 ENCOUNTER — HEALTH MAINTENANCE LETTER (OUTPATIENT)
Age: 49
End: 2024-12-29